# Patient Record
Sex: MALE | Race: WHITE | NOT HISPANIC OR LATINO | Employment: FULL TIME | URBAN - METROPOLITAN AREA
[De-identification: names, ages, dates, MRNs, and addresses within clinical notes are randomized per-mention and may not be internally consistent; named-entity substitution may affect disease eponyms.]

---

## 2017-07-14 ENCOUNTER — OFFICE VISIT (OUTPATIENT)
Dept: INTERNAL MEDICINE | Facility: CLINIC | Age: 22
End: 2017-07-14
Payer: COMMERCIAL

## 2017-07-14 VITALS
OXYGEN SATURATION: 99 % | BODY MASS INDEX: 32.01 KG/M2 | DIASTOLIC BLOOD PRESSURE: 84 MMHG | HEIGHT: 67 IN | WEIGHT: 203.94 LBS | SYSTOLIC BLOOD PRESSURE: 130 MMHG | TEMPERATURE: 97 F | HEART RATE: 87 BPM

## 2017-07-14 DIAGNOSIS — Z00.00 PREVENTATIVE HEALTH CARE: ICD-10-CM

## 2017-07-14 DIAGNOSIS — F41.9 ANXIETY: Primary | ICD-10-CM

## 2017-07-14 PROCEDURE — 99214 OFFICE O/P EST MOD 30 MIN: CPT | Mod: S$GLB,,, | Performed by: INTERNAL MEDICINE

## 2017-07-14 PROCEDURE — 99999 PR PBB SHADOW E&M-EST. PATIENT-LVL III: CPT | Mod: PBBFAC,,, | Performed by: INTERNAL MEDICINE

## 2017-07-14 RX ORDER — BUSPIRONE HYDROCHLORIDE 10 MG/1
10 TABLET ORAL 2 TIMES DAILY
Qty: 60 TABLET | Refills: 1 | Status: SHIPPED | OUTPATIENT
Start: 2017-07-14 | End: 2017-08-10 | Stop reason: SDUPTHER

## 2017-07-14 RX ORDER — PHENTERMINE HYDROCHLORIDE 37.5 MG/1
37.5 CAPSULE ORAL EVERY MORNING
COMMUNITY
End: 2017-07-14

## 2017-07-14 NOTE — PROGRESS NOTES
"Subjective:      Patient ID: Fredrick Castillo is a 21 y.o. male.    Chief Complaint: Establish Care    22 yo with Patient Active Problem List:     Anxiety, generalized     Headache    Here today c/o anxiety for years. Took lexapro 10mg and 20 mg but did not feel it was helpful. Felt wt gain on lexapro. Parents have anxiety. Some paranoia that others may be talking or laughing about him. Reassurance is not as helpful as it used to be. Feeling nervous, anxious, or on edge. Worrying too much about different things. Trouble relaxing.  Becoming easily annoyed or irritable.  Feeling afraid as if something awful might happen. Denies depression. Symptoms all of his life. Denies panic attacks.      Review of Systems   Constitutional: Negative for chills and fever.   HENT: Negative for ear pain and sore throat.    Respiratory: Negative for cough.    Cardiovascular: Negative for chest pain.   Gastrointestinal: Negative for abdominal pain and blood in stool.   Genitourinary: Negative for dysuria and hematuria.   Neurological: Negative for seizures and syncope.   Psychiatric/Behavioral: Negative for dysphoric mood, hallucinations and suicidal ideas. The patient is nervous/anxious.      Objective:   /84 (BP Location: Right arm, Patient Position: Sitting)   Pulse 87   Temp 97.1 °F (36.2 °C) (Tympanic)   Ht 5' 7" (1.702 m)   Wt 92.5 kg (203 lb 14.8 oz)   SpO2 99%   BMI 31.94 kg/m²     Physical Exam   Constitutional: He is oriented to person, place, and time. He appears well-developed and well-nourished. No distress.   HENT:   Head: Normocephalic and atraumatic.   Mouth/Throat: Oropharynx is clear and moist.   Eyes: EOM are normal. Pupils are equal, round, and reactive to light.   Neck: Neck supple. No thyromegaly present.   Cardiovascular: Normal rate and regular rhythm.    Pulmonary/Chest: Breath sounds normal. He has no wheezes. He has no rales.   Abdominal: Soft. Bowel sounds are normal. There is no tenderness. "   Lymphadenopathy:     He has no cervical adenopathy.   Neurological: He is alert and oriented to person, place, and time.   Skin: Skin is warm and dry.   Psychiatric: He has a normal mood and affect. His behavior is normal.       Assessment:     1. Anxiety    2. Preventative health care      Plan:   Anxiety  -     Ambulatory referral to Social Work  -     busPIRone (BUSPAR) 10 MG tablet; Take 1 tablet (10 mg total) by mouth 2 (two) times daily.  Dispense: 60 tablet; Refill: 1    Preventative health care  -     Lipid panel; Future; Expected date: 07/14/2017  -     CBC auto differential; Future; Expected date: 07/14/2017  -     Comprehensive metabolic panel; Future; Expected date: 07/14/2017  -     TSH; Future; Expected date: 07/14/2017              Return in about 4 weeks (around 8/11/2017), or if symptoms worsen or fail to improve.

## 2017-08-02 ENCOUNTER — TELEPHONE (OUTPATIENT)
Dept: INTERNAL MEDICINE | Facility: CLINIC | Age: 22
End: 2017-08-02

## 2017-08-02 NOTE — TELEPHONE ENCOUNTER
Message left for patient to return call to clinic re: needs to schedule overdue lab work to prepare for 8/10/17 appt with Dr. Blackmon.

## 2017-08-07 ENCOUNTER — LAB VISIT (OUTPATIENT)
Dept: LAB | Facility: HOSPITAL | Age: 22
End: 2017-08-07
Attending: INTERNAL MEDICINE
Payer: COMMERCIAL

## 2017-08-07 DIAGNOSIS — Z00.00 PREVENTATIVE HEALTH CARE: ICD-10-CM

## 2017-08-07 LAB
ALBUMIN SERPL BCP-MCNC: 4.2 G/DL
ALP SERPL-CCNC: 64 U/L
ALT SERPL W/O P-5'-P-CCNC: 30 U/L
ANION GAP SERPL CALC-SCNC: 12 MMOL/L
AST SERPL-CCNC: 20 U/L
BASOPHILS # BLD AUTO: 0.04 K/UL
BASOPHILS NFR BLD: 0.5 %
BILIRUB SERPL-MCNC: 0.6 MG/DL
BUN SERPL-MCNC: 19 MG/DL
CALCIUM SERPL-MCNC: 9.6 MG/DL
CHLORIDE SERPL-SCNC: 104 MMOL/L
CHOLEST/HDLC SERPL: 4 {RATIO}
CO2 SERPL-SCNC: 21 MMOL/L
CREAT SERPL-MCNC: 1 MG/DL
DIFFERENTIAL METHOD: NORMAL
EOSINOPHIL # BLD AUTO: 0.1 K/UL
EOSINOPHIL NFR BLD: 1.3 %
ERYTHROCYTE [DISTWIDTH] IN BLOOD BY AUTOMATED COUNT: 13.7 %
EST. GFR  (AFRICAN AMERICAN): >60 ML/MIN/1.73 M^2
EST. GFR  (NON AFRICAN AMERICAN): >60 ML/MIN/1.73 M^2
GLUCOSE SERPL-MCNC: 87 MG/DL
HCT VFR BLD AUTO: 45.7 %
HDL/CHOLESTEROL RATIO: 25 %
HDLC SERPL-MCNC: 164 MG/DL
HDLC SERPL-MCNC: 41 MG/DL
HGB BLD-MCNC: 16.1 G/DL
LDLC SERPL CALC-MCNC: 103 MG/DL
LYMPHOCYTES # BLD AUTO: 2.9 K/UL
LYMPHOCYTES NFR BLD: 38.3 %
MCH RBC QN AUTO: 28.8 PG
MCHC RBC AUTO-ENTMCNC: 35.2 G/DL
MCV RBC AUTO: 82 FL
MONOCYTES # BLD AUTO: 0.6 K/UL
MONOCYTES NFR BLD: 8.4 %
NEUTROPHILS # BLD AUTO: 3.7 K/UL
NEUTROPHILS NFR BLD: 51.5 %
NONHDLC SERPL-MCNC: 123 MG/DL
PLATELET # BLD AUTO: 243 K/UL
PMV BLD AUTO: 9.8 FL
POTASSIUM SERPL-SCNC: 4.4 MMOL/L
PROT SERPL-MCNC: 7.3 G/DL
RBC # BLD AUTO: 5.59 M/UL
SODIUM SERPL-SCNC: 137 MMOL/L
TRIGL SERPL-MCNC: 100 MG/DL
TSH SERPL DL<=0.005 MIU/L-ACNC: 1.59 UIU/ML
WBC # BLD AUTO: 7.47 K/UL

## 2017-08-07 PROCEDURE — 84443 ASSAY THYROID STIM HORMONE: CPT

## 2017-08-07 PROCEDURE — 80053 COMPREHEN METABOLIC PANEL: CPT

## 2017-08-07 PROCEDURE — 85025 COMPLETE CBC W/AUTO DIFF WBC: CPT

## 2017-08-07 PROCEDURE — 36415 COLL VENOUS BLD VENIPUNCTURE: CPT | Mod: PO

## 2017-08-07 PROCEDURE — 80061 LIPID PANEL: CPT

## 2017-08-10 ENCOUNTER — OFFICE VISIT (OUTPATIENT)
Dept: INTERNAL MEDICINE | Facility: CLINIC | Age: 22
End: 2017-08-10
Payer: COMMERCIAL

## 2017-08-10 VITALS
WEIGHT: 206.81 LBS | HEIGHT: 67 IN | BODY MASS INDEX: 32.46 KG/M2 | OXYGEN SATURATION: 99 % | DIASTOLIC BLOOD PRESSURE: 86 MMHG | HEART RATE: 88 BPM | TEMPERATURE: 97 F | SYSTOLIC BLOOD PRESSURE: 126 MMHG

## 2017-08-10 DIAGNOSIS — Z00.00 ROUTINE GENERAL MEDICAL EXAMINATION AT A HEALTH CARE FACILITY: Primary | ICD-10-CM

## 2017-08-10 DIAGNOSIS — F41.9 ANXIETY: ICD-10-CM

## 2017-08-10 PROCEDURE — 99395 PREV VISIT EST AGE 18-39: CPT | Mod: S$GLB,,, | Performed by: INTERNAL MEDICINE

## 2017-08-10 PROCEDURE — 99999 PR PBB SHADOW E&M-EST. PATIENT-LVL III: CPT | Mod: PBBFAC,,, | Performed by: INTERNAL MEDICINE

## 2017-08-10 RX ORDER — BUSPIRONE HYDROCHLORIDE 15 MG/1
15 TABLET ORAL 2 TIMES DAILY
Qty: 60 TABLET | Refills: 1 | Status: SHIPPED | OUTPATIENT
Start: 2017-08-10 | End: 2017-08-21 | Stop reason: DRUGHIGH

## 2017-08-10 NOTE — PROGRESS NOTES
"Subjective:      Patient ID: Fredrick Castillo is a 21 y.o. male.    Chief Complaint: Follow-up    22 yo with Patient Active Problem List:     Anxiety, generalized     Headache    Here today for annual exam and f/u on anxiety.  Compliant with meds without significant side effects. Non smoker. Buspar helping. Less bad thoughts. Worrying improved but not resolved.       Review of Systems   Constitutional: Negative for chills and fever.   HENT: Negative for ear pain and sore throat.    Respiratory: Negative for cough.    Cardiovascular: Negative for chest pain.   Gastrointestinal: Negative for abdominal pain and blood in stool.   Genitourinary: Negative for dysuria and hematuria.   Neurological: Negative for seizures and syncope.     Objective:   /86 (BP Location: Right arm, Patient Position: Sitting)   Pulse 88   Temp 96.9 °F (36.1 °C) (Tympanic)   Ht 5' 7" (1.702 m)   Wt 93.8 kg (206 lb 12.7 oz)   SpO2 99%   BMI 32.39 kg/m²     Physical Exam   Constitutional: He is oriented to person, place, and time. He appears well-developed and well-nourished. No distress.   HENT:   Head: Normocephalic and atraumatic.   Mouth/Throat: Oropharynx is clear and moist.   Eyes: EOM are normal. Pupils are equal, round, and reactive to light.   Neck: Neck supple. No thyromegaly present.   Cardiovascular: Normal rate and regular rhythm.    Pulmonary/Chest: Breath sounds normal. He has no wheezes. He has no rales.   Abdominal: Soft. Bowel sounds are normal. There is no tenderness.   Lymphadenopathy:     He has no cervical adenopathy.   Neurological: He is alert and oriented to person, place, and time.   Skin: Skin is warm and dry.   Psychiatric: He has a normal mood and affect. His behavior is normal.     Lab Visit on 08/07/2017   Component Date Value Ref Range Status    Cholesterol 08/07/2017 164  120 - 199 mg/dL Final    Comment: The National Cholesterol Education Program (NCEP) has set the  following guidelines (reference " ranges) for Cholesterol:  Optimal.....................<200 mg/dL  Borderline High.............200-239 mg/dL  High........................> or = 240 mg/dL      Triglycerides 08/07/2017 100  30 - 150 mg/dL Final    Comment: The National Cholesterol Education Program (NCEP) has set the  following guidelines (reference values) for triglycerides:  Normal......................<150 mg/dL  Borderline High.............150-199 mg/dL  High........................200-499 mg/dL      HDL 08/07/2017 41  40 - 75 mg/dL Final    Comment: The National Cholesterol Education Program (NCEP) has set the  following guidelines (reference values) for HDL Cholesterol:  Low...............<40 mg/dL  Optimal...........>60 mg/dL      LDL Cholesterol 08/07/2017 103.0  63.0 - 159.0 mg/dL Final    Comment: The National Cholesterol Education Program (NCEP) has set the  following guidelines (reference values) for LDL Cholesterol:  Optimal.......................<130 mg/dL  Borderline High...............130-159 mg/dL  High..........................160-189 mg/dL  Very High.....................>190 mg/dL      HDL/Chol Ratio 08/07/2017 25.0  20.0 - 50.0 % Final    Total Cholesterol/HDL Ratio 08/07/2017 4.0  2.0 - 5.0 Final    Non-HDL Cholesterol 08/07/2017 123  mg/dL Final    Comment: Risk category and Non-HDL cholesterol goals:  Coronary heart disease (CHD)or equivalent (10-year risk of CHD >20%):  Non-HDL cholesterol goal     <130 mg/dL  Two or more CHD risk factors and 10-year risk of CHD <= 20%:  Non-HDL cholesterol goal     <160 mg/dL  0 to 1 CHD risk factor:  Non-HDL cholesterol goal     <190 mg/dL      WBC 08/07/2017 7.47  3.90 - 12.70 K/uL Final    RBC 08/07/2017 5.59  4.60 - 6.20 M/uL Final    Hemoglobin 08/07/2017 16.1  14.0 - 18.0 g/dL Final    Hematocrit 08/07/2017 45.7  40.0 - 54.0 % Final    MCV 08/07/2017 82  82 - 98 fL Final    MCH 08/07/2017 28.8  27.0 - 31.0 pg Final    MCHC 08/07/2017 35.2  32.0 - 36.0 g/dL Final    RDW  08/07/2017 13.7  11.5 - 14.5 % Final    Platelets 08/07/2017 243  150 - 350 K/uL Final    MPV 08/07/2017 9.8  9.2 - 12.9 fL Final    Gran # 08/07/2017 3.7  1.8 - 7.7 K/uL Final    Lymph # 08/07/2017 2.9  1.0 - 4.8 K/uL Final    Mono # 08/07/2017 0.6  0.3 - 1.0 K/uL Final    Eos # 08/07/2017 0.1  0.0 - 0.5 K/uL Final    Baso # 08/07/2017 0.04  0.00 - 0.20 K/uL Final    Gran% 08/07/2017 51.5  38.0 - 73.0 % Final    Lymph% 08/07/2017 38.3  18.0 - 48.0 % Final    Mono% 08/07/2017 8.4  4.0 - 15.0 % Final    Eosinophil% 08/07/2017 1.3  0.0 - 8.0 % Final    Basophil% 08/07/2017 0.5  0.0 - 1.9 % Final    Differential Method 08/07/2017 Automated   Final    Sodium 08/07/2017 137  136 - 145 mmol/L Final    Potassium 08/07/2017 4.4  3.5 - 5.1 mmol/L Final    Chloride 08/07/2017 104  95 - 110 mmol/L Final    CO2 08/07/2017 21* 23 - 29 mmol/L Final    Glucose 08/07/2017 87  70 - 110 mg/dL Final    BUN, Bld 08/07/2017 19  6 - 20 mg/dL Final    Creatinine 08/07/2017 1.0  0.5 - 1.4 mg/dL Final    Calcium 08/07/2017 9.6  8.7 - 10.5 mg/dL Final    Total Protein 08/07/2017 7.3  6.0 - 8.4 g/dL Final    Albumin 08/07/2017 4.2  3.5 - 5.2 g/dL Final    Total Bilirubin 08/07/2017 0.6  0.1 - 1.0 mg/dL Final    Comment: For infants and newborns, interpretation of results should be based  on gestational age, weight and in agreement with clinical  observations.  Premature Infant recommended reference ranges:  Up to 24 hours.............<8.0 mg/dL  Up to 48 hours............<12.0 mg/dL  3-5 days..................<15.0 mg/dL  6-29 days.................<15.0 mg/dL      Alkaline Phosphatase 08/07/2017 64  55 - 135 U/L Final    AST 08/07/2017 20  10 - 40 U/L Final    ALT 08/07/2017 30  10 - 44 U/L Final    Anion Gap 08/07/2017 12  8 - 16 mmol/L Final    eGFR if African American 08/07/2017 >60.0  >60 mL/min/1.73 m^2 Final    eGFR if non African American 08/07/2017 >60.0  >60 mL/min/1.73 m^2 Final    Comment: Calculation  used to obtain the estimated glomerular filtration  rate (eGFR) is the CKD-EPI equation. Since race is unknown   in our information system, the eGFR values for   -American and Non--American patients are given   for each creatinine result.      TSH 08/07/2017 1.588  0.400 - 4.000 uIU/mL Final       Assessment:     1. Routine general medical examination at a health care facility    2. Anxiety      Plan:   Routine general medical examination at a health care facility  Heart healthy diet and reg exercise   reviewed    Anxiety  -     busPIRone (BUSPAR) 15 MG tablet; Take 1 tablet (15 mg total) by mouth 2 (two) times daily.  Dispense: 60 tablet; Refill: 1    notify me if 15mg bid not satisfactory for consideration of 20 bid.           Return in about 3 months (around 11/10/2017), or if symptoms worsen or fail to improve.

## 2017-08-21 ENCOUNTER — TELEPHONE (OUTPATIENT)
Dept: INTERNAL MEDICINE | Facility: CLINIC | Age: 22
End: 2017-08-21

## 2017-08-21 RX ORDER — BUSPIRONE HYDROCHLORIDE 10 MG/1
20 TABLET ORAL 2 TIMES DAILY
Qty: 120 TABLET | Refills: 2 | Status: SHIPPED | OUTPATIENT
Start: 2017-08-21 | End: 2017-10-23 | Stop reason: SDUPTHER

## 2017-08-21 NOTE — TELEPHONE ENCOUNTER
Pt requesting Buspar be increased from 15 mg twice daily to 20 mg twice daily.  Stated he was told at last appt to call clinic if he didn't think Buspar 15 mg bid was satisfactory.  Notified pt that request will be sent to doctor for approval.  Pt verbalized understanding.

## 2017-08-21 NOTE — TELEPHONE ENCOUNTER
----- Message from Ely Uribe sent at 8/21/2017  9:18 AM CDT -----  Contact: Pt.  Prescription he gave him (can't remember name) would like to have it changed from 15 mg to 20 mg a day.  Please call him at [phone].

## 2017-10-23 ENCOUNTER — OFFICE VISIT (OUTPATIENT)
Dept: INTERNAL MEDICINE | Facility: CLINIC | Age: 22
End: 2017-10-23
Payer: COMMERCIAL

## 2017-10-23 VITALS
HEIGHT: 67 IN | DIASTOLIC BLOOD PRESSURE: 80 MMHG | WEIGHT: 224 LBS | BODY MASS INDEX: 35.16 KG/M2 | OXYGEN SATURATION: 99 % | TEMPERATURE: 97 F | SYSTOLIC BLOOD PRESSURE: 124 MMHG | HEART RATE: 82 BPM

## 2017-10-23 DIAGNOSIS — F41.9 ANXIETY: Primary | ICD-10-CM

## 2017-10-23 PROCEDURE — 99213 OFFICE O/P EST LOW 20 MIN: CPT | Mod: S$GLB,,, | Performed by: INTERNAL MEDICINE

## 2017-10-23 PROCEDURE — 99999 PR PBB SHADOW E&M-EST. PATIENT-LVL III: CPT | Mod: PBBFAC,,, | Performed by: INTERNAL MEDICINE

## 2017-10-23 RX ORDER — BUSPIRONE HYDROCHLORIDE 30 MG/1
30 TABLET ORAL 2 TIMES DAILY
Qty: 60 TABLET | Refills: 3 | Status: SHIPPED | OUTPATIENT
Start: 2017-10-23 | End: 2021-08-25

## 2017-10-23 NOTE — PROGRESS NOTES
"Subjective:      Patient ID: Fredrick Castillo is a 21 y.o. male.    Chief Complaint: Follow-up    20 yo with Patient Active Problem List:     Anxiety, generalized     Headache    Past Medical History:  No date: Anxiety    Here today for management of anxiety.  He reports that he is feeling well today.  He reports that 20 mg twice a day BuSpar improved his anxiety over his 15 mg twice a day.  However he felt that there was still room for some improvement.  He self increased his BuSpar to 30 mg twice daily approximately 2 days ago.  He is interested in continuing this dose to see if he gets further improvement in his anxiety.  He has not yet set up counseling.      Review of Systems   Constitutional: Negative for chills and fever.   HENT: Negative for ear pain and sore throat.    Respiratory: Negative for cough.    Cardiovascular: Negative for chest pain.   Gastrointestinal: Negative for abdominal pain and blood in stool.   Genitourinary: Negative for dysuria and hematuria.   Neurological: Negative for seizures and syncope.     Objective:   /80 (BP Location: Right arm, Patient Position: Sitting)   Pulse 82   Temp 96.5 °F (35.8 °C) (Tympanic)   Ht 5' 7" (1.702 m)   Wt 101.6 kg (223 lb 15.8 oz)   SpO2 99%   BMI 35.08 kg/m²     Physical Exam   Constitutional: He appears well-developed and well-nourished. No distress.   Cardiovascular: Normal rate.    Pulmonary/Chest: Effort normal and breath sounds normal.   Skin: Skin is warm and dry.   Psychiatric: He has a normal mood and affect. His behavior is normal.       Assessment:     1. Anxiety      Plan:   Anxiety  -     busPIRone (BUSPAR) 30 MG Tab; Take 1 tablet (30 mg total) by mouth 2 (two) times daily.  Dispense: 60 tablet; Refill: 3  -     Ambulatory referral to Social Work    advised set up social work appt at earliest convenience.       Problem List Items Addressed This Visit     None      Visit Diagnoses     Anxiety    -  Primary    Relevant Medications    " busPIRone (BUSPAR) 30 MG Tab    Other Relevant Orders    Ambulatory referral to Social Work          Return in about 3 months (around 1/23/2018), or if symptoms worsen or fail to improve.

## 2017-12-27 ENCOUNTER — PATIENT OUTREACH (OUTPATIENT)
Dept: ADMINISTRATIVE | Facility: HOSPITAL | Age: 22
End: 2017-12-27

## 2017-12-28 ENCOUNTER — OFFICE VISIT (OUTPATIENT)
Dept: INTERNAL MEDICINE | Facility: CLINIC | Age: 22
End: 2017-12-28
Payer: COMMERCIAL

## 2017-12-28 VITALS
OXYGEN SATURATION: 98 % | BODY MASS INDEX: 34.84 KG/M2 | HEIGHT: 67 IN | SYSTOLIC BLOOD PRESSURE: 118 MMHG | TEMPERATURE: 97 F | DIASTOLIC BLOOD PRESSURE: 80 MMHG | HEART RATE: 92 BPM | WEIGHT: 222 LBS

## 2017-12-28 DIAGNOSIS — J06.9 UPPER RESPIRATORY TRACT INFECTION, UNSPECIFIED TYPE: Primary | ICD-10-CM

## 2017-12-28 DIAGNOSIS — H69.91 DYSFUNCTION OF RIGHT EUSTACHIAN TUBE: ICD-10-CM

## 2017-12-28 PROCEDURE — 99999 PR PBB SHADOW E&M-EST. PATIENT-LVL III: CPT | Mod: PBBFAC,,, | Performed by: INTERNAL MEDICINE

## 2017-12-28 PROCEDURE — 99213 OFFICE O/P EST LOW 20 MIN: CPT | Mod: S$GLB,,, | Performed by: INTERNAL MEDICINE

## 2017-12-28 RX ORDER — MELOXICAM 15 MG/1
15 TABLET ORAL DAILY
Qty: 15 TABLET | Refills: 0 | Status: SHIPPED | OUTPATIENT
Start: 2017-12-28 | End: 2021-08-25

## 2017-12-28 NOTE — PROGRESS NOTES
"Subjective:      Patient ID: Fredrick Castillo is a 22 y.o. male.    Chief Complaint: Headache (top of head) and Otalgia (right, x 4 days)    23 yo with Patient Active Problem List:     Anxiety, generalized     Headache    Past Medical History:  No date: Anxiety    Here today c/o fever and sore throat starting approximately 1 week ago.  The symptoms resolve spontaneously however he developed right ear ache which seems to be worse with yawning and opening his jaw.  Ear symptoms began about 4 days ago.  Over the last 4 days he has also developed a dry cough and some nasal congestion.  He has had some mild relief with Senait-Hext cold and flu.       Review of Systems   Constitutional: Negative for chills, fatigue and fever.   HENT: Positive for congestion (nasal), postnasal drip and rhinorrhea. Negative for ear pain, sinus pain, sinus pressure, sneezing, sore throat, trouble swallowing and voice change.    Respiratory: Positive for cough. Negative for shortness of breath and wheezing.    Cardiovascular: Negative for chest pain.   Gastrointestinal: Negative for abdominal pain and blood in stool.   Genitourinary: Negative for dysuria and hematuria.   Skin: Negative for rash and wound.   Neurological: Negative for seizures and syncope.     Objective:   /80 (BP Location: Right arm, Patient Position: Sitting)   Pulse 92   Temp 96.7 °F (35.9 °C) (Tympanic)   Ht 5' 7" (1.702 m)   Wt 100.7 kg (222 lb 0.1 oz)   SpO2 98%   BMI 34.77 kg/m²     Physical Exam   Constitutional: He appears well-developed and well-nourished. No distress.   HENT:   Right Ear: Tympanic membrane and ear canal normal.   Left Ear: Tympanic membrane and ear canal normal.   Nose: Mucosal edema and rhinorrhea present. Right sinus exhibits no maxillary sinus tenderness and no frontal sinus tenderness. Left sinus exhibits no maxillary sinus tenderness and no frontal sinus tenderness.   Mouth/Throat: Uvula is midline. No uvula swelling. No " oropharyngeal exudate, posterior oropharyngeal edema, posterior oropharyngeal erythema or tonsillar abscesses.   Eyes: Conjunctivae and EOM are normal.   Cardiovascular: Normal rate and regular rhythm.    Pulmonary/Chest: Effort normal and breath sounds normal.   Lymphadenopathy:     He has no cervical adenopathy.   Skin: Skin is warm and dry.   Psychiatric: He has a normal mood and affect. His behavior is normal.       Assessment:     1. Upper respiratory tract infection, unspecified type    2. Dysfunction of right eustachian tube      Plan:   Upper respiratory tract infection, unspecified type    Dysfunction of right eustachian tube    Other orders  -     meloxicam (MOBIC) 15 MG tablet; Take 1 tablet (15 mg total) by mouth once daily. For pain and inflammation  Dispense: 15 tablet; Refill: 0      flonase nasal spray 2 spays each nostril for nasal congestion, post nasal drip, runny nose    Delsym as needed for cough    Sudafed for nasal congestion    Allegra or zyrtec for allergies, post nasal drip, runny nose, watery eyes.    cepacol throat lozenges for sore throat    mucinex for chest congestion.     Tylenol or ibuprofen for pain or fever.         Problem List Items Addressed This Visit     None      Visit Diagnoses     Upper respiratory tract infection, unspecified type    -  Primary    Dysfunction of right eustachian tube              Return if symptoms worsen or fail to improve.

## 2017-12-29 NOTE — PATIENT INSTRUCTIONS
flonase nasal spray 2 spays each nostril for nasal congestion, post nasal drip, runny nose    Delsym as needed for cough    Sudafed for nasal congestion    Allegra or zyrtec for allergies, post nasal drip, runny nose, watery eyes.    cepacol throat lozenges for sore throat    mucinex for chest congestion.     Tylenol or ibuprofen for pain or fever.

## 2018-01-09 ENCOUNTER — PATIENT OUTREACH (OUTPATIENT)
Dept: ADMINISTRATIVE | Facility: HOSPITAL | Age: 23
End: 2018-01-09

## 2020-04-06 ENCOUNTER — TELEPHONE (OUTPATIENT)
Dept: ADMINISTRATIVE | Facility: HOSPITAL | Age: 25
End: 2020-04-06

## 2020-11-28 ENCOUNTER — CLINICAL SUPPORT (OUTPATIENT)
Dept: URGENT CARE | Facility: CLINIC | Age: 25
End: 2020-11-28
Payer: COMMERCIAL

## 2020-11-28 DIAGNOSIS — Z11.59 SCREENING FOR VIRAL DISEASE: Primary | ICD-10-CM

## 2020-11-28 LAB
CTP QC/QA: YES
SARS-COV-2 RDRP RESP QL NAA+PROBE: NEGATIVE

## 2020-11-28 PROCEDURE — U0002: ICD-10-PCS | Mod: QW,S$GLB,, | Performed by: PHYSICIAN ASSISTANT

## 2020-11-28 PROCEDURE — U0002 COVID-19 LAB TEST NON-CDC: HCPCS | Mod: QW,S$GLB,, | Performed by: PHYSICIAN ASSISTANT

## 2020-12-04 ENCOUNTER — CLINICAL SUPPORT (OUTPATIENT)
Dept: URGENT CARE | Facility: CLINIC | Age: 25
End: 2020-12-04
Payer: COMMERCIAL

## 2020-12-04 DIAGNOSIS — Z11.59 SCREENING FOR VIRAL DISEASE: Primary | ICD-10-CM

## 2020-12-04 LAB
CTP QC/QA: YES
SARS-COV-2 RDRP RESP QL NAA+PROBE: NEGATIVE

## 2020-12-04 PROCEDURE — U0002: ICD-10-PCS | Mod: QW,S$GLB,, | Performed by: NURSE PRACTITIONER

## 2020-12-04 PROCEDURE — U0002 COVID-19 LAB TEST NON-CDC: HCPCS | Mod: QW,S$GLB,, | Performed by: NURSE PRACTITIONER

## 2020-12-04 NOTE — PROGRESS NOTES
Patient came in for COVID test.    CDC Testing and Quarantine Guidelines for Exposure:         A close exposure is defined as anyone who has had an exposure (masked or unmasked) to a known COVID -19 positive person within 6 ft for longer than 15 minutes. If your exposure meets this definition you are required by CDC guidelines to quarantine for at least 7-10 days from time of exposure. The CDC states that a test can be performed for an asymptomatic patient (someone who does not have any symptoms) after a close exposure, and that a test should be done if you develop symptoms after a close exposure as described above.  Specifically, you can test at day 5 or later if asymptomatic, in order to get released from quarantine on day 7 or later.  If you develop symptoms sooner, you should test when your symptoms start.  If you meet the definition of a close exposure, it will not matter whether you are experiencing symptoms- a quarantine for at least 7-10 days after a close exposure is required by CDC guidelines.  Please note, if you decide to test as an asymptomatic during your quarantine and you are positive, you will be restarting your quarantine and moving from a possible 10 day quarantine (if you do not test), to a 11 day or greater quarantine.  The CDC also suggests people still monitor for symptoms for a full 14 days and remember that the shorter quarantine options do not replace initial CDC guidance.  The CDC continues to recommend quarantining for 14 days as the best way to reduce risk for spreading COVID-19 - however, this is only a recommendation.  If your exposure does not meet the above definition, you can return to your normal daily activities to include social distancing, wearing a mask and frequent handwashing

## 2021-08-01 ENCOUNTER — CLINICAL SUPPORT (OUTPATIENT)
Dept: URGENT CARE | Facility: CLINIC | Age: 26
End: 2021-08-01
Payer: COMMERCIAL

## 2021-08-01 DIAGNOSIS — Z11.59 SCREENING FOR VIRAL DISEASE: Primary | ICD-10-CM

## 2021-08-01 LAB
CTP QC/QA: YES
SARS-COV-2 RDRP RESP QL NAA+PROBE: NEGATIVE

## 2021-08-01 PROCEDURE — U0002 COVID-19 LAB TEST NON-CDC: HCPCS | Mod: QW,S$GLB,, | Performed by: NURSE PRACTITIONER

## 2021-08-01 PROCEDURE — U0002: ICD-10-PCS | Mod: QW,S$GLB,, | Performed by: NURSE PRACTITIONER

## 2021-08-25 ENCOUNTER — OFFICE VISIT (OUTPATIENT)
Dept: PSYCHIATRY | Facility: CLINIC | Age: 26
End: 2021-08-25
Payer: COMMERCIAL

## 2021-08-25 VITALS
HEIGHT: 67 IN | DIASTOLIC BLOOD PRESSURE: 92 MMHG | WEIGHT: 231.94 LBS | HEART RATE: 75 BPM | BODY MASS INDEX: 36.4 KG/M2 | OXYGEN SATURATION: 97 % | SYSTOLIC BLOOD PRESSURE: 143 MMHG

## 2021-08-25 DIAGNOSIS — F41.1 GENERALIZED ANXIETY DISORDER: Primary | ICD-10-CM

## 2021-08-25 PROCEDURE — 3077F PR MOST RECENT SYSTOLIC BLOOD PRESSURE >= 140 MM HG: ICD-10-PCS | Mod: CPTII,S$GLB,, | Performed by: PSYCHIATRY & NEUROLOGY

## 2021-08-25 PROCEDURE — 99205 OFFICE O/P NEW HI 60 MIN: CPT | Mod: S$GLB,,, | Performed by: PSYCHIATRY & NEUROLOGY

## 2021-08-25 PROCEDURE — 99999 PR PBB SHADOW E&M-EST. PATIENT-LVL III: ICD-10-PCS | Mod: PBBFAC,,, | Performed by: PSYCHIATRY & NEUROLOGY

## 2021-08-25 PROCEDURE — 1160F RVW MEDS BY RX/DR IN RCRD: CPT | Mod: CPTII,S$GLB,, | Performed by: PSYCHIATRY & NEUROLOGY

## 2021-08-25 PROCEDURE — 1126F AMNT PAIN NOTED NONE PRSNT: CPT | Mod: CPTII,S$GLB,, | Performed by: PSYCHIATRY & NEUROLOGY

## 2021-08-25 PROCEDURE — 1126F PR PAIN SEVERITY QUANTIFIED, NO PAIN PRESENT: ICD-10-PCS | Mod: CPTII,S$GLB,, | Performed by: PSYCHIATRY & NEUROLOGY

## 2021-08-25 PROCEDURE — 1159F MED LIST DOCD IN RCRD: CPT | Mod: CPTII,S$GLB,, | Performed by: PSYCHIATRY & NEUROLOGY

## 2021-08-25 PROCEDURE — 1159F PR MEDICATION LIST DOCUMENTED IN MEDICAL RECORD: ICD-10-PCS | Mod: CPTII,S$GLB,, | Performed by: PSYCHIATRY & NEUROLOGY

## 2021-08-25 PROCEDURE — 99999 PR PBB SHADOW E&M-EST. PATIENT-LVL III: CPT | Mod: PBBFAC,,, | Performed by: PSYCHIATRY & NEUROLOGY

## 2021-08-25 PROCEDURE — 3080F DIAST BP >= 90 MM HG: CPT | Mod: CPTII,S$GLB,, | Performed by: PSYCHIATRY & NEUROLOGY

## 2021-08-25 PROCEDURE — 3077F SYST BP >= 140 MM HG: CPT | Mod: CPTII,S$GLB,, | Performed by: PSYCHIATRY & NEUROLOGY

## 2021-08-25 PROCEDURE — 3008F BODY MASS INDEX DOCD: CPT | Mod: CPTII,S$GLB,, | Performed by: PSYCHIATRY & NEUROLOGY

## 2021-08-25 PROCEDURE — 3080F PR MOST RECENT DIASTOLIC BLOOD PRESSURE >= 90 MM HG: ICD-10-PCS | Mod: CPTII,S$GLB,, | Performed by: PSYCHIATRY & NEUROLOGY

## 2021-08-25 PROCEDURE — 99205 PR OFFICE/OUTPT VISIT, NEW, LEVL V, 60-74 MIN: ICD-10-PCS | Mod: S$GLB,,, | Performed by: PSYCHIATRY & NEUROLOGY

## 2021-08-25 PROCEDURE — 3008F PR BODY MASS INDEX (BMI) DOCUMENTED: ICD-10-PCS | Mod: CPTII,S$GLB,, | Performed by: PSYCHIATRY & NEUROLOGY

## 2021-08-25 PROCEDURE — 1160F PR REVIEW ALL MEDS BY PRESCRIBER/CLIN PHARMACIST DOCUMENTED: ICD-10-PCS | Mod: CPTII,S$GLB,, | Performed by: PSYCHIATRY & NEUROLOGY

## 2021-08-25 RX ORDER — BUPROPION HYDROCHLORIDE 150 MG/1
150 TABLET ORAL DAILY
Qty: 30 TABLET | Refills: 1 | Status: SHIPPED | OUTPATIENT
Start: 2021-08-25 | End: 2021-09-21 | Stop reason: SDUPTHER

## 2021-09-21 ENCOUNTER — TELEPHONE (OUTPATIENT)
Dept: PSYCHIATRY | Facility: CLINIC | Age: 26
End: 2021-09-21

## 2021-09-21 RX ORDER — BUPROPION HYDROCHLORIDE 150 MG/1
150 TABLET ORAL DAILY
Qty: 10 TABLET | Refills: 0 | Status: SHIPPED | OUTPATIENT
Start: 2021-09-21 | End: 2021-10-21 | Stop reason: SDUPTHER

## 2021-09-27 ENCOUNTER — OFFICE VISIT (OUTPATIENT)
Dept: PSYCHIATRY | Facility: CLINIC | Age: 26
End: 2021-09-27
Payer: COMMERCIAL

## 2021-09-27 DIAGNOSIS — F41.1 GENERALIZED ANXIETY DISORDER: Primary | ICD-10-CM

## 2021-09-27 PROCEDURE — 1159F PR MEDICATION LIST DOCUMENTED IN MEDICAL RECORD: ICD-10-PCS | Mod: CPTII,95,, | Performed by: PSYCHIATRY & NEUROLOGY

## 2021-09-27 PROCEDURE — 99214 PR OFFICE/OUTPT VISIT, EST, LEVL IV, 30-39 MIN: ICD-10-PCS | Mod: 95,,, | Performed by: PSYCHIATRY & NEUROLOGY

## 2021-09-27 PROCEDURE — 1160F PR REVIEW ALL MEDS BY PRESCRIBER/CLIN PHARMACIST DOCUMENTED: ICD-10-PCS | Mod: CPTII,95,, | Performed by: PSYCHIATRY & NEUROLOGY

## 2021-09-27 PROCEDURE — 1160F RVW MEDS BY RX/DR IN RCRD: CPT | Mod: CPTII,95,, | Performed by: PSYCHIATRY & NEUROLOGY

## 2021-09-27 PROCEDURE — 99214 OFFICE O/P EST MOD 30 MIN: CPT | Mod: 95,,, | Performed by: PSYCHIATRY & NEUROLOGY

## 2021-09-27 PROCEDURE — 1159F MED LIST DOCD IN RCRD: CPT | Mod: CPTII,95,, | Performed by: PSYCHIATRY & NEUROLOGY

## 2021-09-27 RX ORDER — CITALOPRAM 20 MG/1
20 TABLET, FILM COATED ORAL DAILY
Qty: 30 TABLET | Refills: 1 | Status: SHIPPED | OUTPATIENT
Start: 2021-09-27 | End: 2021-10-21 | Stop reason: SDUPTHER

## 2021-10-21 ENCOUNTER — PATIENT MESSAGE (OUTPATIENT)
Dept: PSYCHIATRY | Facility: CLINIC | Age: 26
End: 2021-10-21

## 2021-10-21 RX ORDER — BUPROPION HYDROCHLORIDE 150 MG/1
150 TABLET ORAL DAILY
Qty: 90 TABLET | Refills: 0 | Status: SHIPPED | OUTPATIENT
Start: 2021-10-21 | End: 2022-01-11 | Stop reason: SDUPTHER

## 2021-10-21 RX ORDER — CITALOPRAM 20 MG/1
20 TABLET, FILM COATED ORAL DAILY
Qty: 90 TABLET | Refills: 0 | Status: SHIPPED | OUTPATIENT
Start: 2021-10-21 | End: 2022-01-11 | Stop reason: SDUPTHER

## 2021-12-30 ENCOUNTER — IMMUNIZATION (OUTPATIENT)
Dept: INTERNAL MEDICINE | Facility: CLINIC | Age: 26
End: 2021-12-30
Payer: COMMERCIAL

## 2021-12-30 DIAGNOSIS — Z23 NEED FOR VACCINATION: Primary | ICD-10-CM

## 2021-12-30 PROCEDURE — 0003A COVID-19, MRNA, LNP-S, PF, 30 MCG/0.3 ML DOSE VACCINE: CPT | Mod: PBBFAC | Performed by: INTERNAL MEDICINE

## 2021-12-30 PROCEDURE — 91300 COVID-19, MRNA, LNP-S, PF, 30 MCG/0.3 ML DOSE VACCINE: CPT | Mod: PBBFAC | Performed by: INTERNAL MEDICINE

## 2022-01-11 ENCOUNTER — PATIENT MESSAGE (OUTPATIENT)
Dept: PSYCHIATRY | Facility: CLINIC | Age: 27
End: 2022-01-11
Payer: COMMERCIAL

## 2022-01-11 RX ORDER — CITALOPRAM 20 MG/1
20 TABLET, FILM COATED ORAL DAILY
Qty: 90 TABLET | Refills: 0 | Status: SHIPPED | OUTPATIENT
Start: 2022-01-11 | End: 2022-01-12 | Stop reason: SDUPTHER

## 2022-01-11 RX ORDER — BUPROPION HYDROCHLORIDE 150 MG/1
150 TABLET ORAL DAILY
Qty: 90 TABLET | Refills: 0 | Status: SHIPPED | OUTPATIENT
Start: 2022-01-11 | End: 2022-01-12 | Stop reason: SDUPTHER

## 2022-01-12 ENCOUNTER — PATIENT MESSAGE (OUTPATIENT)
Dept: PSYCHIATRY | Facility: CLINIC | Age: 27
End: 2022-01-12
Payer: COMMERCIAL

## 2022-01-12 RX ORDER — BUPROPION HYDROCHLORIDE 150 MG/1
150 TABLET ORAL DAILY
Qty: 90 TABLET | Refills: 0 | Status: SHIPPED | OUTPATIENT
Start: 2022-01-12 | End: 2022-04-14 | Stop reason: SDUPTHER

## 2022-01-12 RX ORDER — CITALOPRAM 20 MG/1
20 TABLET, FILM COATED ORAL DAILY
Qty: 90 TABLET | Refills: 0 | Status: SHIPPED | OUTPATIENT
Start: 2022-01-12 | End: 2022-04-14 | Stop reason: SDUPTHER

## 2022-04-14 ENCOUNTER — PATIENT MESSAGE (OUTPATIENT)
Dept: PSYCHIATRY | Facility: CLINIC | Age: 27
End: 2022-04-14
Payer: COMMERCIAL

## 2022-04-14 RX ORDER — CITALOPRAM 20 MG/1
20 TABLET, FILM COATED ORAL DAILY
Qty: 90 TABLET | Refills: 0 | Status: SHIPPED | OUTPATIENT
Start: 2022-04-14 | End: 2022-07-05

## 2022-04-14 RX ORDER — BUPROPION HYDROCHLORIDE 150 MG/1
150 TABLET ORAL DAILY
Qty: 90 TABLET | Refills: 0 | Status: SHIPPED | OUTPATIENT
Start: 2022-04-14 | End: 2022-07-05 | Stop reason: SDUPTHER

## 2022-04-20 ENCOUNTER — PATIENT MESSAGE (OUTPATIENT)
Dept: ADMINISTRATIVE | Facility: OTHER | Age: 27
End: 2022-04-20
Payer: COMMERCIAL

## 2022-06-10 ENCOUNTER — TELEPHONE (OUTPATIENT)
Dept: PSYCHIATRY | Facility: CLINIC | Age: 27
End: 2022-06-10
Payer: COMMERCIAL

## 2022-06-10 NOTE — TELEPHONE ENCOUNTER
Pt called requesting a virtual appt for a later date. R/s appt for 7/5 @ 11:30am for a virtual w/ dr deras

## 2022-07-05 ENCOUNTER — OFFICE VISIT (OUTPATIENT)
Dept: PSYCHIATRY | Facility: CLINIC | Age: 27
End: 2022-07-05
Payer: COMMERCIAL

## 2022-07-05 DIAGNOSIS — F41.1 GENERALIZED ANXIETY DISORDER: Primary | ICD-10-CM

## 2022-07-05 PROCEDURE — 1159F PR MEDICATION LIST DOCUMENTED IN MEDICAL RECORD: ICD-10-PCS | Mod: CPTII,95,, | Performed by: PSYCHIATRY & NEUROLOGY

## 2022-07-05 PROCEDURE — 99214 OFFICE O/P EST MOD 30 MIN: CPT | Mod: 95,,, | Performed by: PSYCHIATRY & NEUROLOGY

## 2022-07-05 PROCEDURE — 1159F MED LIST DOCD IN RCRD: CPT | Mod: CPTII,95,, | Performed by: PSYCHIATRY & NEUROLOGY

## 2022-07-05 PROCEDURE — 1160F RVW MEDS BY RX/DR IN RCRD: CPT | Mod: CPTII,95,, | Performed by: PSYCHIATRY & NEUROLOGY

## 2022-07-05 PROCEDURE — 99214 PR OFFICE/OUTPT VISIT, EST, LEVL IV, 30-39 MIN: ICD-10-PCS | Mod: 95,,, | Performed by: PSYCHIATRY & NEUROLOGY

## 2022-07-05 PROCEDURE — 1160F PR REVIEW ALL MEDS BY PRESCRIBER/CLIN PHARMACIST DOCUMENTED: ICD-10-PCS | Mod: CPTII,95,, | Performed by: PSYCHIATRY & NEUROLOGY

## 2022-07-05 RX ORDER — CITALOPRAM 20 MG/1
30 TABLET, FILM COATED ORAL DAILY
Qty: 135 TABLET | Refills: 1 | Status: SHIPPED | OUTPATIENT
Start: 2022-07-05 | End: 2022-10-12 | Stop reason: SDUPTHER

## 2022-07-05 RX ORDER — BUPROPION HYDROCHLORIDE 150 MG/1
150 TABLET ORAL DAILY
Qty: 90 TABLET | Refills: 1 | Status: SHIPPED | OUTPATIENT
Start: 2022-07-05 | End: 2022-10-12 | Stop reason: SDUPTHER

## 2022-07-05 NOTE — PROGRESS NOTES
Outpatient Psychiatry Follow-Up Visit (MD/NP)    7/5/2022    The patient location is: home; [place], [place]  The chief complaint leading to consultation is: anxiety    Visit type: audiovisual    Face to Face time with patient: 20 minutes  30 minutes of total time spent on the encounter, which includes face to face time and non-face to face time preparing to see the patient (eg, review of tests), Obtaining and/or reviewing separately obtained history, Documenting clinical information in the electronic or other health record, Independently interpreting results (not separately reported) and communicating results to the patient/family/caregiver, or Care coordination (not separately reported).         Each patient to whom he or she provides medical services by telemedicine is:  (1) informed of the relationship between the physician and patient and the respective role of any other health care provider with respect to management of the patient; and (2) notified that he or she may decline to receive medical services by telemedicine and may withdraw from such care at any time.      Clinical Status of Patient:  Outpatient (Ambulatory)    Chief Complaint:  Fredrick Castillo is a 26 y.o. male who presents today for follow-up of anxiety.  Met with patient.      Interval History and Content of Current Session:  Interim Events/Subjective Report/Content of Current Session: Patient Fredrick Castillo presents to clinic for follow up.  Has increased anxiety with a new job.  Also worried because cristy said that they would not renew [employer] in Sept, so he and ficlara have to move.  Also, they are getting  in May 2023.  Says that he is trying to stay positive and  although can occasionally let his worries get the best of him.  He still get irrational thinking from time to time.  Likes Wellbutrin and doesn't want to get off but does feel that the anxiety can use better control.  Likes his medications.  No side effects noted or  endorsed.  Also, not dieting or exercising.    In the past Lexapro gave him weight gain, buspirone did not do anything.  Incidentally phentermine for weight loss did help calm his anxiety.  Best response to Wellbutrin/Celexa combo.    Psychotherapy:  · Target symptoms: anxiety   · Why chosen therapy is appropriate versus another modality: relevant to diagnosis  · Outcome monitoring methods: self-report, observation  · Therapeutic intervention type: supportive psychotherapy  · Topics discussed/themes: building skills sets for symptom management, symptom recognition  · The patient's response to the intervention is accepting. The patient's progress toward treatment goals is limited.   · Duration of intervention: 15 minutes.    Review of Systems   · PSYCHIATRIC: Pertinant items are noted in the narrative.  · CONSTITUTIONAL: No weight gain or loss.   · MUSCULOSKELETAL: No pain or stiffness of the joints.  · NEUROLOGIC: No weakness, sensory changes, seizures, confusion, memory loss, tremor or other abnormal movements.  · RESPIRATORY: No shortness of breath.  · CARDIOVASCULAR: No tachycardia or chest pain.  · GASTROINTESTINAL: No nausea, vomiting, pain, constipation or diarrhea.    Past Medical, Family and Social History: The patient's past medical, family and social history have been reviewed and updated as appropriate within the electronic medical record - see encounter notes.    Compliance: yes    Side effects: None    Risk Parameters:  Patient reports no suicidal ideation  Patient reports no homicidal ideation  Patient reports no self-injurious behavior  Patient reports no violent behavior    Exam (detailed: at least 9 elements; comprehensive: all 15 elements)   Constitutional  Vitals:  Most recent vital signs, dated less than 90 days prior to this appointment, were reviewed.   There were no vitals filed for this visit.     General:  age appropriate, overweight     Musculoskeletal  Muscle Strength/Tone:  no tremor, no  tic   Gait & Station:  not observed; video visit     Psychiatric  Speech:  no latency; no press   Mood & Affect:  anxious  congruent and appropriate   Thought Process:  normal and logical   Associations:  intact   Thought Content:  normal, no suicidality, no homicidality, delusions, or paranoia   Insight:  intact   Judgement: limited   Orientation:  person, place, situation, time/date   Memory: intact for content of interview   Language: able to name, able to repeat   Attention Span & Concentration:  able to focus   Fund of Knowledge:  intact and appropriate to age and level of education     Assessment and Diagnosis   Status/Progress: Based on the examination today, the patient's problem(s) is/are adequately but not ideally controlled.  New problems have been presented today.   Co-morbidities are complicating management of the primary condition.  There are no active rule-out diagnoses for this patient at this time.     General Impression: We will continue pharmacological intervention and adjunctive therapy.       ICD-10-CM ICD-9-CM   1. Generalized anxiety disorder  F41.1 300.02       Intervention/Counseling/Treatment Plan   · Medication Management: Continue current medications. The risks and benefits of medication were discussed with the patient.  · Counseling provided with patient as follows: importance of compliance with chosen treatment options was emphasized, risks and benefits of treatment options, including medications, were discussed with the patient, risk factor reduction, prognosis, patient education, instructions for  management, treatment and follow-up were reviewed  1. Continue Wellbutrin  mg daily targeting anxiety.  Warned of risk of doug, suicidality, serotonin syndrome, seizures.  When he runs out of this medication, he is going to stop it.  2. Increase citalopram to 30 mg daily targeting anxiety and depression.  Warned of risk of doug, suicidality, serotonin syndrome sexual side  effects.  3. May consider putting patient in therapy in the future.  4. Counseled that a lot of his blackout spells may be coming from his episodic/rare alcohol use.    5. He has an anxious baseline to his personality.  Anxiety may never go away, likely only to be better controlled.  Also has a lot of OCD qualities to his anxiety.  6. Will have to monitor his blood pressure.    Return to Clinic: 3 months, as needed

## 2022-07-05 NOTE — PATIENT INSTRUCTIONS

## 2022-07-15 ENCOUNTER — PATIENT MESSAGE (OUTPATIENT)
Dept: PSYCHIATRY | Facility: CLINIC | Age: 27
End: 2022-07-15
Payer: COMMERCIAL

## 2022-10-13 ENCOUNTER — PATIENT MESSAGE (OUTPATIENT)
Dept: PSYCHIATRY | Facility: CLINIC | Age: 27
End: 2022-10-13
Payer: COMMERCIAL

## 2022-10-13 RX ORDER — BUPROPION HYDROCHLORIDE 300 MG/1
300 TABLET ORAL DAILY
Qty: 30 TABLET | Refills: 0 | Status: SHIPPED | OUTPATIENT
Start: 2022-10-13 | End: 2022-10-24 | Stop reason: SDUPTHER

## 2022-10-13 RX ORDER — CITALOPRAM 20 MG/1
30 TABLET, FILM COATED ORAL DAILY
Qty: 135 TABLET | Refills: 0 | Status: SHIPPED | OUTPATIENT
Start: 2022-10-13 | End: 2022-10-24

## 2022-10-24 ENCOUNTER — OFFICE VISIT (OUTPATIENT)
Dept: PSYCHIATRY | Facility: CLINIC | Age: 27
End: 2022-10-24
Payer: COMMERCIAL

## 2022-10-24 ENCOUNTER — PATIENT MESSAGE (OUTPATIENT)
Dept: PSYCHIATRY | Facility: CLINIC | Age: 27
End: 2022-10-24
Payer: COMMERCIAL

## 2022-10-24 VITALS
DIASTOLIC BLOOD PRESSURE: 80 MMHG | OXYGEN SATURATION: 97 % | SYSTOLIC BLOOD PRESSURE: 120 MMHG | HEIGHT: 67 IN | WEIGHT: 236.44 LBS | BODY MASS INDEX: 37.11 KG/M2 | HEART RATE: 75 BPM

## 2022-10-24 DIAGNOSIS — F41.1 GENERALIZED ANXIETY DISORDER: Primary | ICD-10-CM

## 2022-10-24 PROCEDURE — 99999 PR PBB SHADOW E&M-EST. PATIENT-LVL III: CPT | Mod: PBBFAC,,, | Performed by: PSYCHIATRY & NEUROLOGY

## 2022-10-24 PROCEDURE — 99214 PR OFFICE/OUTPT VISIT, EST, LEVL IV, 30-39 MIN: ICD-10-PCS | Mod: S$GLB,,, | Performed by: PSYCHIATRY & NEUROLOGY

## 2022-10-24 PROCEDURE — 99999 PR PBB SHADOW E&M-EST. PATIENT-LVL III: ICD-10-PCS | Mod: PBBFAC,,, | Performed by: PSYCHIATRY & NEUROLOGY

## 2022-10-24 PROCEDURE — 99214 OFFICE O/P EST MOD 30 MIN: CPT | Mod: S$GLB,,, | Performed by: PSYCHIATRY & NEUROLOGY

## 2022-10-24 RX ORDER — BUPROPION HYDROCHLORIDE 300 MG/1
300 TABLET ORAL DAILY
Qty: 90 TABLET | Refills: 1 | Status: SHIPPED | OUTPATIENT
Start: 2022-10-24 | End: 2023-02-01 | Stop reason: SDUPTHER

## 2022-10-24 RX ORDER — CITALOPRAM 40 MG/1
40 TABLET, FILM COATED ORAL DAILY
Qty: 90 TABLET | Refills: 1 | Status: SHIPPED | OUTPATIENT
Start: 2022-10-24 | End: 2023-02-01 | Stop reason: SDUPTHER

## 2022-10-24 NOTE — PATIENT INSTRUCTIONS

## 2022-10-24 NOTE — PROGRESS NOTES
"Outpatient Psychiatry Follow-Up Visit (MD/NP)    10/24/2022    Clinical Status of Patient:  Outpatient (Ambulatory)    Chief Complaint:  Fredrick Castillo is a 26 y.o. male who presents today for follow-up of anxiety.  Met with patient.      Interval History and Content of Current Session:  Interim Events/Subjective Report/Content of Current Session: Patient Fredrick Castillo presents to clinic for follow up.  He feels much better.  Reached out to me that he has been accidentally taking 2 of the Wellbutrin and 2 of the citalopram.  Likes how they helped him.  Feels that he is not digging as much into anxious situations.  He now complains that he is having trouble with focusing.  Did better with focus when he took phentermine for weight loss and wishes to get back on stimulant therapy.  He says that is is "impossible to sit and do something".  He is trying to limit his distractions.  Feels motivated but can't execute on doing something.  Asking to continue his current meds.  Wedding set for May 2023.  Found a home in [place]. No more black out spells.    In the past Lexapro gave him weight gain, buspirone did not do anything.  Incidentally phentermine for weight loss did help calm his anxiety.  Best response to Wellbutrin/Celexa combo.    Psychotherapy:  Target symptoms: anxiety   Why chosen therapy is appropriate versus another modality: relevant to diagnosis  Outcome monitoring methods: self-report, observation  Therapeutic intervention type: supportive psychotherapy  Topics discussed/themes: building skills sets for symptom management, symptom recognition  The patient's response to the intervention is accepting. The patient's progress toward treatment goals is limited.   Duration of intervention: 15 minutes.    Review of Systems   PSYCHIATRIC: Pertinant items are noted in the narrative.  CONSTITUTIONAL: No weight gain or loss.   MUSCULOSKELETAL: No pain or stiffness of the joints.  NEUROLOGIC: No weakness, sensory " "changes, seizures, confusion, memory loss, tremor or other abnormal movements.  RESPIRATORY: No shortness of breath.  CARDIOVASCULAR: No tachycardia or chest pain.  GASTROINTESTINAL: No nausea, vomiting, pain, constipation or diarrhea.    Past Medical, Family and Social History: The patient's past medical, family and social history have been reviewed and updated as appropriate within the electronic medical record - see encounter notes.    Compliance: yes    Side effects: None    Risk Parameters:  Patient reports no suicidal ideation  Patient reports no homicidal ideation  Patient reports no self-injurious behavior  Patient reports no violent behavior    Exam (detailed: at least 9 elements; comprehensive: all 15 elements)   Constitutional  Vitals:  Most recent vital signs, dated less than 90 days prior to this appointment, were reviewed.   Vitals:    10/24/22 1002   BP: 120/80   Pulse: 75   SpO2: 97%   Weight: 107.2 kg (236 lb 7.1 oz)   Height: 5' 7" (1.702 m)        General:  age appropriate, overweight     Musculoskeletal  Muscle Strength/Tone:  no tremor, no tic   Gait & Station:  non-ataxic     Psychiatric  Speech:  no latency; no press   Mood & Affect:  steady  congruent and appropriate   Thought Process:  normal and logical   Associations:  intact   Thought Content:  normal, no suicidality, no homicidality, delusions, or paranoia   Insight:  intact   Judgement: limited   Orientation:  person, place, situation, time/date   Memory: intact for content of interview   Language: able to name, able to repeat   Attention Span & Concentration:  able to focus   Fund of Knowledge:  intact and appropriate to age and level of education     Assessment and Diagnosis   Status/Progress: Based on the examination today, the patient's problem(s) is/are adequately but not ideally controlled.  New problems have been presented today.   Co-morbidities are complicating management of the primary condition.  There are no active rule-out " diagnoses for this patient at this time.     General Impression: We will continue pharmacological intervention and adjunctive therapy.       ICD-10-CM ICD-9-CM   1. Generalized anxiety disorder  F41.1 300.02         Intervention/Counseling/Treatment Plan   Medication Management: Continue current medications. The risks and benefits of medication were discussed with the patient.  Counseling provided with patient as follows: importance of compliance with chosen treatment options was emphasized, risks and benefits of treatment options, including medications, were discussed with the patient, risk factor reduction, prognosis, patient education, instructions for  management, treatment and follow-up were reviewed  1. Continue Wellbutrin  mg daily targeting anxiety.  Warned of risk of doug, suicidality, serotonin syndrome, seizures.  When he runs out of this medication, he is going to stop it.  2. Continue citalopram 40 mg daily targeting anxiety and depression.  Warned of risk of doug, suicidality, serotonin syndrome sexual side effects.  3. May consider putting patient in therapy in the future.  4. Counseled that a lot of his blackout spells may be coming from his episodic/rare alcohol use.  Watch for seizure activity (none reported) with Wellbutrin.  5. He has an anxious baseline to his personality.  Anxiety may never go away, likely only to be better controlled.  Also has a lot of OCD qualities to his anxiety.  6. Will have to monitor his blood pressure.  7. Told patient that he is not a candidate for stimulant therapy at this time.  Seems to focus well in office.  Will refer to [facility] adult ADHD program should it start up next year.    Return to Clinic: 6 months, as needed

## 2023-02-01 ENCOUNTER — PATIENT MESSAGE (OUTPATIENT)
Dept: PSYCHIATRY | Facility: CLINIC | Age: 28
End: 2023-02-01
Payer: COMMERCIAL

## 2023-02-01 RX ORDER — BUPROPION HYDROCHLORIDE 300 MG/1
300 TABLET ORAL DAILY
Qty: 90 TABLET | Refills: 1 | OUTPATIENT
Start: 2023-02-01

## 2023-02-01 RX ORDER — CITALOPRAM 40 MG/1
40 TABLET, FILM COATED ORAL DAILY
Qty: 90 TABLET | Refills: 1 | Status: SHIPPED | OUTPATIENT
Start: 2023-02-01 | End: 2023-05-03 | Stop reason: SDUPTHER

## 2023-02-01 RX ORDER — BUPROPION HYDROCHLORIDE 300 MG/1
300 TABLET ORAL DAILY
Qty: 90 TABLET | Refills: 1 | Status: SHIPPED | OUTPATIENT
Start: 2023-02-01 | End: 2023-05-03 | Stop reason: SDUPTHER

## 2023-02-01 RX ORDER — CITALOPRAM 40 MG/1
40 TABLET, FILM COATED ORAL DAILY
Qty: 90 TABLET | Refills: 1 | OUTPATIENT
Start: 2023-02-01

## 2023-02-01 NOTE — TELEPHONE ENCOUNTER
No new care gaps identified.  [facility] Embedded Care Gaps. Reference number: [id number]. 2/01/2023   9:28:15 AM CST

## 2023-02-27 ENCOUNTER — PATIENT MESSAGE (OUTPATIENT)
Dept: PSYCHIATRY | Facility: CLINIC | Age: 28
End: 2023-02-27
Payer: COMMERCIAL

## 2023-05-03 RX ORDER — CITALOPRAM 40 MG/1
40 TABLET, FILM COATED ORAL DAILY
Qty: 90 TABLET | Refills: 1 | Status: SHIPPED | OUTPATIENT
Start: 2023-05-03 | End: 2024-03-15 | Stop reason: SDUPTHER

## 2023-05-03 RX ORDER — BUPROPION HYDROCHLORIDE 300 MG/1
300 TABLET ORAL DAILY
Qty: 90 TABLET | Refills: 1 | Status: SHIPPED | OUTPATIENT
Start: 2023-05-03 | End: 2024-03-15 | Stop reason: SDUPTHER

## 2023-05-03 NOTE — TELEPHONE ENCOUNTER
Pt called after hours yesterday, lvm requesting call back to make appt w/ PA, transfer from Dr. DE LA TORRE. Also in need of refills.    Returned call, appt made w/ pt w/ Renita for 5/11 @ 3:30pm virtually. Made pt aware temp fill of needed rx's will be sent in until appt date w/ stacia.    No further concerns voiced.

## 2023-05-12 ENCOUNTER — TELEPHONE (OUTPATIENT)
Dept: PSYCHIATRY | Facility: CLINIC | Age: 28
End: 2023-05-12
Payer: COMMERCIAL

## 2023-06-02 ENCOUNTER — OFFICE VISIT (OUTPATIENT)
Dept: PSYCHIATRY | Facility: CLINIC | Age: 28
End: 2023-06-02
Payer: COMMERCIAL

## 2023-06-02 DIAGNOSIS — F41.1 GENERALIZED ANXIETY DISORDER: Primary | ICD-10-CM

## 2023-06-02 DIAGNOSIS — G47.00 INSOMNIA, UNSPECIFIED TYPE: ICD-10-CM

## 2023-06-02 PROCEDURE — 99214 OFFICE O/P EST MOD 30 MIN: CPT | Mod: 95,,, | Performed by: PHYSICIAN ASSISTANT

## 2023-06-02 PROCEDURE — 99214 PR OFFICE/OUTPT VISIT, EST, LEVL IV, 30-39 MIN: ICD-10-PCS | Mod: 95,,, | Performed by: PHYSICIAN ASSISTANT

## 2023-06-02 RX ORDER — HYDROXYZINE HYDROCHLORIDE 25 MG/1
12.5-5 TABLET, FILM COATED ORAL NIGHTLY PRN
Qty: 90 TABLET | Refills: 1 | Status: SHIPPED | OUTPATIENT
Start: 2023-06-02 | End: 2023-11-29

## 2023-06-02 NOTE — PROGRESS NOTES
Outpatient Psychiatry Follow-Up Visit (CLAUDINE)    6/2/2023    Clinical Status of Patient:  Outpatient (Ambulatory)    Chief Complaint:  Fredrick Castillo is a 27 y.o. male who presents today for follow-up of anxiety.  Met with patient.     The patient location is:  Home med address on record  The chief complaint leading to consultation is:  Continuation of care for generalized anxiety disorder    Visit type: audiovisual    Face to Face time with patient:  15 minutes  Thirty minutes of total time spent on the encounter, which includes face to face time and non-face to face time preparing to see the patient (eg, review of tests), Obtaining and/or reviewing separately obtained history, Documenting clinical information in the electronic or other health record, Independently interpreting results (not separately reported) and communicating results to the patient/family/caregiver, or Care coordination (not separately reported).         Each patient to whom he or she provides medical services by telemedicine is:  (1) informed of the relationship between the physician and patient and the respective role of any other health care provider with respect to management of the patient; and (2) notified that he or she may decline to receive medical services by telemedicine and may withdraw from such care at any time.    Notes:        Interval History and Content of Current Session:  Interim Events/Subjective Report/Content of Current Session: Patient Fredrick Castillo presents to clinic for follow up.  He is establishing care with new provider after previous provider, Dr. Nguyen, left [facility].  Is diagnosed with generalized anxiety disorder and is currently taking bupropion  mg daily and citalopram 40 mg daily.  Has been on this for about a year and states he is feeling good, anxiety is well controlled.  States over the past month or so he has been having trouble sleeping at times.  Feels like he can not get  comfortable.    Patient's symptoms of anxiety center around imagining the worst case scenarios, and not trusting others when they reassure him that these eventualities are not likely to happen.  He also reports he has a lot of anxious thoughts that pop into his head.  States these are all much better since finding this combination of medications.  He is not interested in therapy at this time because he is feeling well.    Patient denies a history of substance abuse, has never been hospitalized for psychiatric reasons, denies any history of suicidal ideation, and denies history of psychosis.      Patient has an  consultant, works from home.  Wife works from home as well, in marketing.  They have no kids and have a hopkins retriever.  Has only been  for about a year.      In the past Lexapro gave him weight gain, buspirone did not do anything.  Incidentally phentermine for weight loss did help calm his anxiety.  Best response to Wellbutrin/Celexa combo.      Review of Systems   PSYCHIATRIC: Pertinant items are noted in the narrative.  CONSTITUTIONAL: No weight gain or loss.   MUSCULOSKELETAL: No pain or stiffness of the joints.  NEUROLOGIC: No weakness, sensory changes, seizures, confusion, memory loss, tremor or other abnormal movements.  RESPIRATORY: No shortness of breath.  CARDIOVASCULAR: No tachycardia or chest pain.  GASTROINTESTINAL: No nausea, vomiting, pain, constipation or diarrhea.    Past Medical, Family and Social History: The patient's past medical, family and social history have been reviewed and updated as appropriate within the electronic medical record - see encounter notes.    Compliance: yes    Side effects: None    Risk Parameters:  Patient reports no suicidal ideation  Patient reports no homicidal ideation  Patient reports no self-injurious behavior  Patient reports no violent behavior    Exam (detailed: at least 9 elements; comprehensive: all 15 elements)    Constitutional  Vitals:  Most recent vital signs, dated less than 90 days prior to this appointment, were reviewed.   There were no vitals filed for this visit.       General:  age appropriate, overweight     Musculoskeletal  Muscle Strength/Tone:  no tremor, no tic   Gait & Station:  Not observed     Psychiatric  Speech:  no latency; no press   Mood & Affect:  steady  congruent and appropriate   Thought Process:  normal and logical   Associations:  intact   Thought Content:  normal, no suicidality, no homicidality, delusions, or paranoia   Insight:  intact   Judgement: limited   Orientation:  person, place, situation, time/date   Memory: intact for content of interview   Language: able to name, able to repeat   Attention Span & Concentration:  able to focus   Fund of Knowledge:  intact and appropriate to age and level of education     Assessment and Diagnosis   Status/Progress: Based on the examination today, the patient's problem(s) is/are well controlled.  New problems have been presented today.   Co-morbidities are complicating management of the primary condition.  There are no active rule-out diagnoses for this patient at this time.     General Impression:  Generalized anxiety disorder doing well on citalopram 40 mg daily and bupropion  mg daily.  There is record of complaints of attention difficulties in the last couple of visits, although patient does not bring that up today.  Recommended working on optimizing asleep environment and making sure his bed and pillow are comfortable, temperature is preferable, etcetera.  Adding hydroxyzine 12.5-50 mg Q HS prn.      ICD-10-CM ICD-9-CM   1. Generalized anxiety disorder  F41.1 300.02         Intervention/Counseling/Treatment Plan   Medication Management: Continue current medications. The risks and benefits of medication were discussed with the patient.  Counseling provided with patient as follows: importance of compliance with chosen treatment options was  emphasized, risks and benefits of treatment options, including medications, were discussed with the patient, risk factor reduction, prognosis, patient education, instructions for  management, treatment and follow-up were reviewed    1. Continue Wellbutrin  mg daily targeting anxiety.  Warned of risk of doug, suicidality, serotonin syndrome, seizures.  When he runs out of this medication, he is going to stop it.  2. Continue citalopram 40 mg daily targeting anxiety and depression.  Warned of risk of doug, suicidality, serotonin syndrome sexual side effects.  3. Trial of hydroxyzine 12.5-50 mg at bedtime as needed for sleep.  Risks/bebefits/side effects discussed including sedation and dry mucous membranes.  4. Continue six month followups.    Return to Clinic: 6 months, as needed

## 2024-03-15 RX ORDER — CITALOPRAM 40 MG/1
40 TABLET, FILM COATED ORAL DAILY
Qty: 30 TABLET | Refills: 0 | Status: SHIPPED | OUTPATIENT
Start: 2024-03-15 | End: 2024-03-18 | Stop reason: SDUPTHER

## 2024-03-15 RX ORDER — BUPROPION HYDROCHLORIDE 300 MG/1
300 TABLET ORAL DAILY
Qty: 30 TABLET | Refills: 0 | Status: SHIPPED | OUTPATIENT
Start: 2024-03-15 | End: 2024-03-18 | Stop reason: SDUPTHER

## 2024-03-18 ENCOUNTER — OFFICE VISIT (OUTPATIENT)
Dept: PSYCHIATRY | Facility: CLINIC | Age: 29
End: 2024-03-18
Payer: COMMERCIAL

## 2024-03-18 DIAGNOSIS — G47.00 INSOMNIA, UNSPECIFIED TYPE: ICD-10-CM

## 2024-03-18 DIAGNOSIS — F41.1 GENERALIZED ANXIETY DISORDER: Primary | ICD-10-CM

## 2024-03-18 PROCEDURE — 99214 OFFICE O/P EST MOD 30 MIN: CPT | Mod: 95,,, | Performed by: PHYSICIAN ASSISTANT

## 2024-03-18 PROCEDURE — G2211 COMPLEX E/M VISIT ADD ON: HCPCS | Mod: 95,,, | Performed by: PHYSICIAN ASSISTANT

## 2024-03-18 RX ORDER — BUPROPION HYDROCHLORIDE 300 MG/1
300 TABLET ORAL DAILY
Qty: 90 TABLET | Refills: 1 | Status: SHIPPED | OUTPATIENT
Start: 2024-03-18 | End: 2024-09-14

## 2024-03-18 RX ORDER — CITALOPRAM 40 MG/1
40 TABLET, FILM COATED ORAL DAILY
Qty: 90 TABLET | Refills: 1 | Status: SHIPPED | OUTPATIENT
Start: 2024-03-18 | End: 2024-09-14

## 2024-03-18 NOTE — PROGRESS NOTES
Outpatient Psychiatry Follow-Up Visit ([facility])    3/18/2024    Clinical Status of Patient:  Outpatient (Ambulatory)    Chief Complaint:  Fredrick Castillo is a 28 y.o. male who presents today for follow-up of anxiety.  Met with patient.     The patient location is:  At work in [place]  The chief complaint leading to consultation is:  Continuation of care for generalized anxiety disorder    Visit type: audiovisual    Face to Face time with patient:  3 minutes  15 minutes of total time spent on the encounter, which includes face to face time and non-face to face time preparing to see the patient (eg, review of tests), Obtaining and/or reviewing separately obtained history, Documenting clinical information in the electronic or other health record, Independently interpreting results (not separately reported) and communicating results to the patient/family/caregiver, or Care coordination (not separately reported).         Each patient to whom he or she provides medical services by telemedicine is:  (1) informed of the relationship between the physician and patient and the respective role of any other health care provider with respect to management of the patient; and (2) notified that he or she may decline to receive medical services by telemedicine and may withdraw from such care at any time.    Notes:        Interval History and Content of Current Session:  Interim Events/Subjective Report/Content of Current Session: Patient Fredrick Castillo presents to clinic for follow up.  He is currently taking bupropion  mg daily and citalopram 40 mg daily.  States he had a few down days but realized that he had forgotten to take his meds for 3 days.  Is feeling better having been back on them for a week now.  Denies any issues of problems.      Review of Systems   PSYCHIATRIC: Pertinant items are noted in the narrative.  CONSTITUTIONAL: No weight gain or loss.   MUSCULOSKELETAL: No pain or stiffness of the joints.  NEUROLOGIC:  No weakness, sensory changes, seizures, confusion, memory loss, tremor or other abnormal movements.  RESPIRATORY: No shortness of breath.  CARDIOVASCULAR: No tachycardia or chest pain.  GASTROINTESTINAL: No nausea, vomiting, pain, constipation or diarrhea.    Past Medical, Family and Social History: The patient's past medical, family and social history have been reviewed and updated as appropriate within the electronic medical record - see encounter notes.    Compliance: yes    Side effects: None    Risk Parameters:  Patient reports no suicidal ideation  Patient reports no homicidal ideation  Patient reports no self-injurious behavior  Patient reports no violent behavior    Exam (detailed: at least 9 elements; comprehensive: all 15 elements)   Constitutional  Vitals:  Most recent vital signs, dated less than 90 days prior to this appointment, were reviewed.   There were no vitals filed for this visit.       General:  age appropriate, overweight     Musculoskeletal  Muscle Strength/Tone:  no tremor, no tic   Gait & Station:  Not observed     Psychiatric  Speech:  no latency; no press   Mood & Affect:  steady  congruent and appropriate   Thought Process:  normal and logical   Associations:  intact   Thought Content:  normal, no suicidality, no homicidality, delusions, or paranoia   Insight:  intact   Judgement: limited   Orientation:  person, place, situation, time/date   Memory: intact for content of interview   Language: able to name, able to repeat   Attention Span & Concentration:  able to focus   Fund of Knowledge:  intact and appropriate to age and level of education     Assessment and Diagnosis   Status/Progress: Based on the examination today, the patient's problem(s) is/are well controlled.  New problems have been presented today.   Co-morbidities are complicating management of the primary condition.  There are no active rule-out diagnoses for this patient at this time.     General Impression:  Generalized  anxiety disorder doing well on citalopram 40 mg daily and bupropion  mg daily.  Sleeping well still.      ICD-10-CM ICD-9-CM   1. Generalized anxiety disorder  F41.1 300.02   2. Insomnia, unspecified type  G47.00 780.52       Intervention/Counseling/Treatment Plan   Medication Management: Continue current medications. The risks and benefits of medication were discussed with the patient.  Counseling provided with patient as follows: importance of compliance with chosen treatment options was emphasized, risks and benefits of treatment options, including medications, were discussed with the patient, risk factor reduction, prognosis, patient education, instructions for  management, treatment and follow-up were reviewed    1. Continue Wellbutrin  mg daily targeting anxiety.  Warned of risk of doug, suicidality, serotonin syndrome, seizures.  When he runs out of this medication, he is going to stop it.  2. Continue citalopram 40 mg daily targeting anxiety and depression.  Warned of risk of doug, suicidality, serotonin syndrome sexual side effects.  3. Continue six month followups.    Return to Clinic: 6 months, as needed    Visit today included increased complexity associated with the care of the episodic problem anxiety addressed and managing the longitudinal care of the patient due to the serious and/or complex managed problem(s) NERY and insomnia.

## 2024-10-17 ENCOUNTER — PATIENT MESSAGE (OUTPATIENT)
Dept: PSYCHIATRY | Facility: CLINIC | Age: 29
End: 2024-10-17
Payer: COMMERCIAL

## 2024-10-17 RX ORDER — CITALOPRAM 40 MG/1
40 TABLET, FILM COATED ORAL DAILY
Qty: 30 TABLET | Refills: 0 | Status: SHIPPED | OUTPATIENT
Start: 2024-10-17 | End: 2024-11-16

## 2024-10-17 RX ORDER — BUPROPION HYDROCHLORIDE 300 MG/1
300 TABLET ORAL DAILY
Qty: 30 TABLET | Refills: 0 | Status: SHIPPED | OUTPATIENT
Start: 2024-10-17 | End: 2024-11-16

## 2024-11-05 ENCOUNTER — OFFICE VISIT (OUTPATIENT)
Dept: PSYCHIATRY | Facility: CLINIC | Age: 29
End: 2024-11-05
Payer: COMMERCIAL

## 2024-11-05 DIAGNOSIS — G47.00 INSOMNIA, UNSPECIFIED TYPE: ICD-10-CM

## 2024-11-05 DIAGNOSIS — F41.1 GENERALIZED ANXIETY DISORDER: Primary | ICD-10-CM

## 2024-11-05 PROCEDURE — 99214 OFFICE O/P EST MOD 30 MIN: CPT | Mod: 95,,, | Performed by: PHYSICIAN ASSISTANT

## 2024-11-05 PROCEDURE — G2211 COMPLEX E/M VISIT ADD ON: HCPCS | Mod: 95,,, | Performed by: PHYSICIAN ASSISTANT

## 2024-11-05 RX ORDER — CITALOPRAM 40 MG/1
40 TABLET, FILM COATED ORAL DAILY
Qty: 90 TABLET | Refills: 1 | Status: SHIPPED | OUTPATIENT
Start: 2024-11-05 | End: 2025-05-04

## 2024-11-05 RX ORDER — BUPROPION HYDROCHLORIDE 300 MG/1
300 TABLET ORAL DAILY
Qty: 90 TABLET | Refills: 1 | Status: SHIPPED | OUTPATIENT
Start: 2024-11-05 | End: 2025-05-04

## 2024-11-05 NOTE — PROGRESS NOTES
Outpatient Psychiatry Follow-Up Visit ([facility])    11/5/2024    Clinical Status of Patient:  Outpatient (Ambulatory)    Chief Complaint:  Fredrick Castillo is a 28 y.o. male who presents today for follow-up of anxiety.  Met with patient.     The patient location is:  At work in [place]  The chief complaint leading to consultation is:  Continuation of care for generalized anxiety disorder    Visit type: audiovisual    Face to Face time with patient:  6 minutes  15 minutes of total time spent on the encounter, which includes face to face time and non-face to face time preparing to see the patient (eg, review of tests), Obtaining and/or reviewing separately obtained history, Documenting clinical information in the electronic or other health record, Independently interpreting results (not separately reported) and communicating results to the patient/family/caregiver, or Care coordination (not separately reported).         Each patient to whom he or she provides medical services by telemedicine is:  (1) informed of the relationship between the physician and patient and the respective role of any other health care provider with respect to management of the patient; and (2) notified that he or she may decline to receive medical services by telemedicine and may withdraw from such care at any time.    Notes:        Interval History and Content of Current Session:  Interim Events/Subjective Report/Content of Current Session: Patient Fredrick Castillo presents to clinic for follow up.  He is currently taking bupropion  mg daily and citalopram 40 mg daily.  States a few months ago he stopped taking his medications because he got on phentermine for weight loss and didn't think he could mix the medications.  States his anxiety worsened, so he stopped phentermine and restarted Wellbutrin and Celexa a month or so ago.  States he is feeling less anxious but anxiety is not quite as well controlled as it was prior to stopping the  medications.    Review of Systems   PSYCHIATRIC: Pertinant items are noted in the narrative.    Past Medical, Family and Social History: The patient's past medical, family and social history have been reviewed and updated as appropriate within the electronic medical record - see encounter notes.    Compliance: yes    Side effects: None    Risk Parameters:  Patient reports no suicidal ideation  Patient reports no homicidal ideation  Patient reports no self-injurious behavior  Patient reports no violent behavior    Exam (detailed: at least 9 elements; comprehensive: all 15 elements)   Constitutional  Vitals:  Most recent vital signs, dated less than 90 days prior to this appointment, were reviewed.   There were no vitals filed for this visit.       General:  age appropriate, overweight     Musculoskeletal  Muscle Strength/Tone:  no tremor, no tic   Gait & Station:  Not observed     Psychiatric  Speech:  no latency; no press   Mood & Affect:  steady  congruent and appropriate   Thought Process:  normal and logical   Associations:  intact   Thought Content:  normal, no suicidality, no homicidality, delusions, or paranoia   Insight:  intact   Judgement: limited   Orientation:  person, place, situation, time/date   Memory: intact for content of interview   Language: able to name, able to repeat   Attention Span & Concentration:  able to focus   Fund of Knowledge:  intact and appropriate to age and level of education     Assessment and Diagnosis   Status/Progress: Based on the examination today, the patient's problem(s) is/are adequately but not ideally controlled.  New problems have not been presented today.   Co-morbidities are complicating management of the primary condition.  There are no active rule-out diagnoses for this patient at this time.     General Impression:  Generalized anxiety disorder, not doing quite as well as before 2 month medication hiatus.  Will give a few more months for the medications to return to  maximum efficacy and reassess.      ICD-10-CM ICD-9-CM   1. Generalized anxiety disorder  F41.1 300.02   2. Insomnia, unspecified type  G47.00 780.52     1. Continue Wellbutrin  mg daily targeting anxiety.  Warned of risk of doug, suicidality, serotonin syndrome, seizures.  When he runs out of this medication, he is going to stop it.  2. Continue citalopram 40 mg daily targeting anxiety and depression.  Warned of risk of doug, suicidality, serotonin syndrome sexual side effects.  3. F/u 3 months    Intervention/Counseling/Treatment Plan   Medication Management: Continue current medications. The risks and benefits of medication were discussed with the patient.  Counseling provided with patient as follows: importance of compliance with chosen treatment options was emphasized, risks and benefits of treatment options, including medications, were discussed with the patient, risk factor reduction, prognosis, patient education, instructions for  management, treatment and follow-up were reviewed        Return to Clinic: 3 months    Visit today included increased complexity associated with the care of the episodic problem anxiety addressed and managing the longitudinal care of the patient due to the serious and/or complex managed problem(s) NERY and insomnia.

## 2025-08-08 ENCOUNTER — PATIENT MESSAGE (OUTPATIENT)
Dept: PSYCHIATRY | Facility: CLINIC | Age: 30
End: 2025-08-08
Payer: COMMERCIAL

## 2025-08-11 ENCOUNTER — OFFICE VISIT (OUTPATIENT)
Dept: PSYCHIATRY | Facility: CLINIC | Age: 30
End: 2025-08-11
Payer: COMMERCIAL

## 2025-08-11 DIAGNOSIS — G47.00 INSOMNIA, UNSPECIFIED TYPE: ICD-10-CM

## 2025-08-11 DIAGNOSIS — F41.1 GENERALIZED ANXIETY DISORDER: Primary | ICD-10-CM

## 2025-08-11 PROCEDURE — 98006 SYNCH AUDIO-VIDEO EST MOD 30: CPT | Mod: 95,,, | Performed by: PHYSICIAN ASSISTANT

## 2025-08-11 PROCEDURE — G2211 COMPLEX E/M VISIT ADD ON: HCPCS | Mod: 95,,, | Performed by: PHYSICIAN ASSISTANT

## 2025-08-11 RX ORDER — CITALOPRAM 40 MG/1
40 TABLET ORAL DAILY
Qty: 90 TABLET | Refills: 1 | Status: SHIPPED | OUTPATIENT
Start: 2025-08-11 | End: 2026-02-07

## 2025-08-11 RX ORDER — BUPROPION HYDROCHLORIDE 150 MG/1
150 TABLET ORAL DAILY
Qty: 30 TABLET | Refills: 0 | Status: SHIPPED | OUTPATIENT
Start: 2025-08-11 | End: 2025-09-10

## 2025-08-11 RX ORDER — BUPROPION HYDROCHLORIDE 300 MG/1
300 TABLET ORAL DAILY
Qty: 90 TABLET | Refills: 1 | Status: SHIPPED | OUTPATIENT
Start: 2025-08-11 | End: 2026-02-07

## 2025-08-15 ENCOUNTER — PATIENT MESSAGE (OUTPATIENT)
Dept: PSYCHIATRY | Facility: CLINIC | Age: 30
End: 2025-08-15
Payer: COMMERCIAL

## 2025-09-02 RX ORDER — BUPROPION HYDROCHLORIDE 150 MG/1
150 TABLET ORAL
Qty: 90 TABLET | Refills: 1 | OUTPATIENT
Start: 2025-09-02